# Patient Record
Sex: FEMALE | Race: AMERICAN INDIAN OR ALASKA NATIVE
[De-identification: names, ages, dates, MRNs, and addresses within clinical notes are randomized per-mention and may not be internally consistent; named-entity substitution may affect disease eponyms.]

---

## 2017-11-18 ENCOUNTER — HOSPITAL ENCOUNTER (EMERGENCY)
Dept: HOSPITAL 5 - ED | Age: 22
Discharge: HOME | End: 2017-11-18
Payer: COMMERCIAL

## 2017-11-18 VITALS — DIASTOLIC BLOOD PRESSURE: 59 MMHG | SYSTOLIC BLOOD PRESSURE: 108 MMHG

## 2017-11-18 DIAGNOSIS — F12.10: ICD-10-CM

## 2017-11-18 DIAGNOSIS — H60.02: Primary | ICD-10-CM

## 2017-11-18 DIAGNOSIS — F17.200: ICD-10-CM

## 2017-11-18 PROCEDURE — 87116 MYCOBACTERIA CULTURE: CPT

## 2017-11-18 NOTE — EMERGENCY DEPARTMENT REPORT
ED ENT HPI





- General


Chief complaint: Earache


Stated complaint: KNOT ON EAR


Time Seen by Provider: 17 20:06


Source: patient


Mode of arrival: Ambulatory


Limitations: No Limitations





- History of Present Illness


Initial comments: 


22F PMH none p/w c/o 1 week of left ear pain. c/o small swollen area directly 

behind left ear. Denies tinnitus, no fever or chills, no nausea or vomiting. 


MD complaint: ear pain


Onset/Timin


-: week(s)


Location: L ear


Severity: moderate


Severity scale (0 -10): 6


Quality: aching


Consistency: constant


Improves with: none


Worsens with: none





- Related Data


 Previous Rx's











 Medication  Instructions  Recorded  Last Taken  Type


 


Hydrocortisone 1% [Hydrocortisone 1 applicatio TP TID #1 tube 02/24/15 Unknown 

Rx





1% CREAM]    


 


Prednisone 20 mg PO DAILY #5 tablet 02/24/15 Unknown Rx


 


hydrOXYzine HCL [Atarax] 25 mg PO Q6HR PRN #20 tablet 02/24/15 Unknown Rx


 


Cephalexin [Keflex] 500 mg PO BID #14 capsule 17 Unknown Rx


 


Ibuprofen [Motrin] 800 mg PO Q8HR PRN #30 tablet 17 Unknown Rx


 


Sulfamethoxazole/Trimethoprim 1 each PO BID #14 tablet 17 Unknown Rx





[Bactrim DS TAB]    











 Allergies











Allergy/AdvReac Type Severity Reaction Status Date / Time


 


No Known Allergies Allergy   Verified 17 19:37














ED Dental HPI





- General


Chief complaint: Earache


Stated complaint: KNOT ON EAR


Time Seen by Provider: 17 20:06


Source: patient


Mode of arrival: Ambulatory


Limitations: No Limitations





- Related Data


 Previous Rx's











 Medication  Instructions  Recorded  Last Taken  Type


 


Hydrocortisone 1% [Hydrocortisone 1 applicatio TP TID #1 tube 02/24/15 Unknown 

Rx





1% CREAM]    


 


Prednisone 20 mg PO DAILY #5 tablet 02/24/15 Unknown Rx


 


hydrOXYzine HCL [Atarax] 25 mg PO Q6HR PRN #20 tablet 02/24/15 Unknown Rx


 


Cephalexin [Keflex] 500 mg PO BID #14 capsule 17 Unknown Rx


 


Ibuprofen [Motrin] 800 mg PO Q8HR PRN #30 tablet 17 Unknown Rx


 


Sulfamethoxazole/Trimethoprim 1 each PO BID #14 tablet 17 Unknown Rx





[Bactrim DS TAB]    











 Allergies











Allergy/AdvReac Type Severity Reaction Status Date / Time


 


No Known Allergies Allergy   Verified 17 19:37














ED Review of Systems


ROS: 


Stated complaint: KNOT ON EAR


Other details as noted in HPI





Constitutional: denies: chills, fever


Eyes: denies: eye pain, eye discharge, vision change


ENT: ear pain (pain behind left ear, small amount of swelling).  denies: throat 

pain


Respiratory: denies: cough, shortness of breath, wheezing


Cardiovascular: denies: chest pain, palpitations


Endocrine: no symptoms reported


Gastrointestinal: denies: abdominal pain, nausea, diarrhea


Genitourinary: denies: urgency, dysuria, discharge


Musculoskeletal: denies: back pain, joint swelling, arthralgia


Skin: denies: rash, lesions


Neurological: denies: headache, weakness, paresthesias


Psychiatric: denies: anxiety, depression


Hematological/Lymphatic: denies: easy bleeding, easy bruising





ED Past Medical Hx





- Past Medical History


Previous Medical History?: No





- Surgical History


Past Surgical History?: No





- Social History


Smoking Status: Current Every Day Smoker


Substance Use Type: Alcohol, Marijuana





- Medications


Home Medications: 


 Home Medications











 Medication  Instructions  Recorded  Confirmed  Last Taken  Type


 


Hydrocortisone 1% [Hydrocortisone 1 applicatio TP TID #1 tube 02/24/15  Unknown 

Rx





1% CREAM]     


 


Prednisone 20 mg PO DAILY #5 tablet 02/24/15  Unknown Rx


 


hydrOXYzine HCL [Atarax] 25 mg PO Q6HR PRN #20 tablet 02/24/15  Unknown Rx


 


Cephalexin [Keflex] 500 mg PO BID #14 capsule 17  Unknown Rx


 


Ibuprofen [Motrin] 800 mg PO Q8HR PRN #30 tablet 17  Unknown Rx


 


Sulfamethoxazole/Trimethoprim 1 each PO BID #14 tablet 17  Unknown Rx





[Bactrim DS TAB]     














ED Physical Exam





- General


Limitations: No Limitations


General appearance: alert, in no apparent distress





- Head


Head exam: Present: atraumatic, normocephalic





- Eye


Eye exam: Present: normal appearance, PERRL, EOMI





- ENT


ENT exam: Present: mucous membranes moist, other (small abscess approximately 2 

cm in length directly behind left ear and postauricular area.  No clinical 

mastoid tenderness on exam.  Otoscopic exam bilateral ears are normal no 

involvement of tympanic membrane no effusion)





- Expanded ENT Exam


  ** Expanded


Mouth exam: Present: normal external inspection


Teeth exam: Present: normal inspection


Throat exam: Positive: normal inspection





- Neck


Neck exam: Present: normal inspection, full ROM





- Respiratory


Respiratory exam: Present: normal lung sounds bilaterally.  Absent: respiratory 

distress





- Cardiovascular


Cardiovascular Exam: Present: regular rate, normal rhythm.  Absent: systolic 

murmur, diastolic murmur, rubs, gallop





- GI/Abdominal


GI/Abdominal exam: Present: soft, normal bowel sounds





- Extremities Exam


Extremities exam: Present: normal inspection





- Back Exam


Back exam: Present: normal inspection





- Neurological Exam


Neurological exam: Present: alert, oriented X3, CN II-XII intact, normal gait





- Psychiatric


Psychiatric exam: Present: normal affect, normal mood





- Skin


Skin exam: Present: warm, dry, intact, normal color.  Absent: rash





ED Course





 Vital Signs











  17





  19:37


 


Temperature 99.7 F H


 


Pulse Rate 73


 


Respiratory 18





Rate 


 


Blood Pressure 108/59


 


O2 Sat by Pulse 100





Oximetry 














- I & D


  ** Left Ear


Type of Procedure: Simple


Site: small abscess behind left ear less than 2cm


Blade Size: 11


I & D Procedure: betadine prep


Progress: 


2 ccs of lidocaine 1% without epi applied to area, good anesthesia achieved, 

small incision made less than 1 cm , small amount of purulent drainage, 1/4 

inch of iodoform gauze 2 inches packed in wound, tolerated well





ED Medical Decision Making





- Medical Decision Making


A/P: Abscess


1-abscess incised and drained, I discussed case with attending and will also 

examine the patient


2-Motrin when necessary, warm compresses


3-bactrim and keflex bid 7 days, 


4- I advised pt to return to ED for fever, chills, nausea, vomiting, worsened 

pain or reaccumulation of abscess


Critical care attestation.: 


If time is entered above; I have spent that time in minutes in the direct care 

of this critically ill patient, excluding procedure time.








ED Disposition


Clinical Impression: 


 Abscess





Disposition: DC- TO HOME OR SELFCARE


Is pt being admited?: No


Does the pt Need Aspirin: No


Condition: Stable


Instructions:  Abscess (ED), Abscess Incision and Drainage (ED)


Additional Instructions: 


Returns to the ED in 48-72 hours for wound check and packing removal


Prescriptions: 


Cephalexin [Keflex] 500 mg PO BID #14 capsule


Ibuprofen [Motrin] 800 mg PO Q8HR PRN #30 tablet


 PRN Reason: Pain


Sulfamethoxazole/Trimethoprim [Bactrim DS TAB] 1 each PO BID #14 tablet


Referrals: 


[facility] [Provider Group] - 3-5 Days


[facility] [Outside] - 3-5 Days


Forms:  Work/School Release Form(ED)


Time of Disposition: 21:16

## 2018-02-09 ENCOUNTER — HOSPITAL ENCOUNTER (EMERGENCY)
Dept: HOSPITAL 5 - ED | Age: 23
Discharge: LEFT BEFORE DISCHARGE | End: 2018-02-09
Payer: SELF-PAY

## 2018-02-09 VITALS — SYSTOLIC BLOOD PRESSURE: 140 MMHG | DIASTOLIC BLOOD PRESSURE: 77 MMHG

## 2018-02-09 DIAGNOSIS — R51: ICD-10-CM

## 2018-02-09 DIAGNOSIS — Z53.21: ICD-10-CM

## 2018-02-09 DIAGNOSIS — J02.9: Primary | ICD-10-CM

## 2018-02-09 DIAGNOSIS — R50.9: ICD-10-CM

## 2020-09-25 ENCOUNTER — HOSPITAL ENCOUNTER (EMERGENCY)
Dept: HOSPITAL 5 - ED | Age: 25
Discharge: HOME | End: 2020-09-25
Payer: SELF-PAY

## 2020-09-25 VITALS — SYSTOLIC BLOOD PRESSURE: 102 MMHG | DIASTOLIC BLOOD PRESSURE: 58 MMHG

## 2020-09-25 DIAGNOSIS — S16.1XXA: ICD-10-CM
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
## 2020-09-25 DIAGNOSIS — Z79.899: ICD-10-CM
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
## 2020-09-25 DIAGNOSIS — S46.812A: Primary | ICD-10-CM

## 2020-09-25 DIAGNOSIS — F17.200: ICD-10-CM
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
## 2020-09-25 DIAGNOSIS — M25.532: ICD-10-CM
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
## 2020-09-25 PROCEDURE — 99282 EMERGENCY DEPT VISIT SF MDM: CPT
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
## 2020-11-16 ENCOUNTER — HOSPITAL ENCOUNTER (EMERGENCY)
Dept: HOSPITAL 5 - ED | Age: 25
Discharge: HOME | End: 2020-11-16
Payer: COMMERCIAL

## 2020-11-16 VITALS — SYSTOLIC BLOOD PRESSURE: 121 MMHG | DIASTOLIC BLOOD PRESSURE: 82 MMHG

## 2020-11-16 DIAGNOSIS — Z79.1: ICD-10-CM
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
## 2020-11-16 DIAGNOSIS — Z79.899: ICD-10-CM
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
## 2020-11-16 DIAGNOSIS — S93.401A: Primary | ICD-10-CM
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
